# Patient Record
Sex: FEMALE | Race: WHITE | URBAN - METROPOLITAN AREA
[De-identification: names, ages, dates, MRNs, and addresses within clinical notes are randomized per-mention and may not be internally consistent; named-entity substitution may affect disease eponyms.]

---

## 2023-05-05 ENCOUNTER — OFFICE VISIT (OUTPATIENT)
Dept: URBAN - METROPOLITAN AREA CLINIC 42 | Facility: CLINIC | Age: 43
End: 2023-05-05
Payer: COMMERCIAL

## 2023-05-05 DIAGNOSIS — H52.13 MYOPIA, BILATERAL: ICD-10-CM

## 2023-05-05 DIAGNOSIS — E11.9 DIABETES MELLITUS TYPE 2 WITHOUT MENTION OF COMPLICATION: Primary | ICD-10-CM

## 2023-05-05 PROCEDURE — 99204 OFFICE O/P NEW MOD 45 MIN: CPT | Performed by: OPHTHALMOLOGY

## 2023-05-05 ASSESSMENT — VISUAL ACUITY
OD: 20/20
OS: 20/20

## 2023-05-05 ASSESSMENT — INTRAOCULAR PRESSURE
OS: 12
OD: 11

## 2023-05-05 NOTE — IMPRESSION/PLAN
Impression: Diabetes mellitus Type 2 without mention of complication: M21.6. Plan: No background retinopathy, no signs of neovascularization. Discuss ocular and systemic benefits of blood sugar control.

## 2024-05-10 ENCOUNTER — OFFICE VISIT (OUTPATIENT)
Dept: URBAN - METROPOLITAN AREA CLINIC 42 | Facility: CLINIC | Age: 44
End: 2024-05-10
Payer: COMMERCIAL

## 2024-05-10 DIAGNOSIS — H52.13 MYOPIA, BILATERAL: ICD-10-CM

## 2024-05-10 DIAGNOSIS — H01.001 UNSPECIFIED BLEPARITIS OF RIGHT UPPER EYELID: ICD-10-CM

## 2024-05-10 DIAGNOSIS — E11.9 DIABETES MELLITUS TYPE 2 WITHOUT MENTION OF COMPLICATION: Primary | ICD-10-CM

## 2024-05-10 PROCEDURE — 99214 OFFICE O/P EST MOD 30 MIN: CPT | Performed by: OPHTHALMOLOGY

## 2024-05-10 RX ORDER — ERYTHROMYCIN 5 MG/G
OINTMENT OPHTHALMIC
Qty: 5 | Refills: 1 | Status: ACTIVE
Start: 2024-05-10

## 2024-05-10 ASSESSMENT — INTRAOCULAR PRESSURE
OS: 15
OD: 15

## 2024-05-10 ASSESSMENT — VISUAL ACUITY
OS: 20/20
OD: 20/20

## 2025-05-16 ENCOUNTER — OFFICE VISIT (OUTPATIENT)
Dept: URBAN - METROPOLITAN AREA CLINIC 42 | Facility: CLINIC | Age: 45
End: 2025-05-16
Payer: COMMERCIAL

## 2025-05-16 DIAGNOSIS — H52.13 MYOPIA, BILATERAL: ICD-10-CM

## 2025-05-16 DIAGNOSIS — E11.9 DIABETES MELLITUS TYPE 2 WITHOUT MENTION OF COMPLICATION: Primary | ICD-10-CM

## 2025-05-16 DIAGNOSIS — H52.4 PRESBYOPIA: ICD-10-CM

## 2025-05-16 DIAGNOSIS — H04.123 TEAR FILM INSUFFICIENCY OF BILATERAL LACRIMAL GLANDS: ICD-10-CM

## 2025-05-16 PROCEDURE — 99214 OFFICE O/P EST MOD 30 MIN: CPT | Performed by: OPHTHALMOLOGY

## 2025-05-16 PROCEDURE — 92015 DETERMINE REFRACTIVE STATE: CPT | Performed by: OPHTHALMOLOGY

## 2025-05-16 ASSESSMENT — VISUAL ACUITY
OS: 20/20
OD: 20/20

## 2025-05-16 ASSESSMENT — INTRAOCULAR PRESSURE
OD: 14
OS: 14